# Patient Record
Sex: MALE | URBAN - METROPOLITAN AREA
[De-identification: names, ages, dates, MRNs, and addresses within clinical notes are randomized per-mention and may not be internally consistent; named-entity substitution may affect disease eponyms.]

---

## 2021-07-02 ENCOUNTER — HOSPITAL ENCOUNTER (EMERGENCY)
Facility: CLINIC | Age: 46
Discharge: HOME | End: 2021-07-02
Attending: EMERGENCY MEDICINE | Admitting: EMERGENCY MEDICINE

## 2021-07-02 VITALS
DIASTOLIC BLOOD PRESSURE: 135 MMHG | TEMPERATURE: 98.1 F | HEIGHT: 68 IN | SYSTOLIC BLOOD PRESSURE: 200 MMHG | WEIGHT: 225 LBS | RESPIRATION RATE: 13 BRPM | OXYGEN SATURATION: 100 % | HEART RATE: 102 BPM | BODY MASS INDEX: 34.1 KG/M2

## 2021-07-02 LAB — INTERPRETATION ECG - MUSE: NORMAL

## 2021-07-02 PROCEDURE — 999N000104 HC STATISTIC NO CHARGE: Performed by: EMERGENCY MEDICINE

## 2021-07-02 PROCEDURE — 93005 ELECTROCARDIOGRAM TRACING: CPT | Performed by: EMERGENCY MEDICINE

## 2021-07-02 ASSESSMENT — MIFFLIN-ST. JEOR: SCORE: 1865.09

## 2021-07-02 NOTE — ED TRIAGE NOTES
Pt arrived to ER for sickle cell crisis.  Pt states pain is generalized, but does have chest pain for last hour and half.  Pt took 3 nitros at home with relief.  EKG: ST, Pt alert and oriented,

## 2021-07-02 NOTE — ED NOTES
"Pt left without being seen by MD stating you \"dont need to know that\", refused to sign AMA, refused to see doctor. Stated, Sick of getting stuck because vascular access could not get PIV. Monique and this author did not see PIV and verified with vascular that no PIV placed.  MD informed of situation.  Pt and partner left facility.    "

## 2021-07-02 NOTE — ED PROVIDER NOTES
2 separate nurses recognize the patient as someone that he had seen within the last few weeks under a different name.  As I was preparing to walk in and see the patient, I was told by the nurse that he decided he no longer want to be evaluated and wanted to leave.  I did not see this patient.    Dictation Disclaimer: Some of this Note has been completed with voice-recognition dictation software. Although errors are generally corrected real-time, there is the potential for a rare error to be present in the completed chart.       Nia Ramírez MD  07/02/21 0205